# Patient Record
Sex: FEMALE | Race: WHITE | URBAN - METROPOLITAN AREA
[De-identification: names, ages, dates, MRNs, and addresses within clinical notes are randomized per-mention and may not be internally consistent; named-entity substitution may affect disease eponyms.]

---

## 2024-02-21 ENCOUNTER — LAB (OUTPATIENT)
Dept: LAB | Facility: LAB | Age: 54
End: 2024-02-21
Payer: COMMERCIAL

## 2024-02-21 ENCOUNTER — PRE-ADMISSION TESTING (OUTPATIENT)
Dept: PREADMISSION TESTING | Facility: HOSPITAL | Age: 54
End: 2024-02-21
Payer: COMMERCIAL

## 2024-02-21 VITALS
RESPIRATION RATE: 16 BRPM | HEIGHT: 64 IN | DIASTOLIC BLOOD PRESSURE: 77 MMHG | SYSTOLIC BLOOD PRESSURE: 154 MMHG | HEART RATE: 66 BPM | BODY MASS INDEX: 23.86 KG/M2 | WEIGHT: 139.77 LBS | OXYGEN SATURATION: 97 % | TEMPERATURE: 98.6 F

## 2024-02-21 DIAGNOSIS — S83.232D COMPLEX TEAR OF MEDIAL MENISCUS OF LEFT KNEE AS CURRENT INJURY, SUBSEQUENT ENCOUNTER: ICD-10-CM

## 2024-02-21 DIAGNOSIS — Z01.818 PRE-OP TESTING: ICD-10-CM

## 2024-02-21 DIAGNOSIS — Z01.818 PRE-OP TESTING: Primary | ICD-10-CM

## 2024-02-21 LAB
ANION GAP SERPL CALC-SCNC: 11 MMOL/L (ref 10–20)
BUN SERPL-MCNC: 13 MG/DL (ref 6–23)
CALCIUM SERPL-MCNC: 9.7 MG/DL (ref 8.6–10.3)
CHLORIDE SERPL-SCNC: 101 MMOL/L (ref 98–107)
CO2 SERPL-SCNC: 30 MMOL/L (ref 21–32)
CREAT SERPL-MCNC: 0.73 MG/DL (ref 0.5–1.05)
EGFRCR SERPLBLD CKD-EPI 2021: >90 ML/MIN/1.73M*2
GLUCOSE SERPL-MCNC: 84 MG/DL (ref 74–99)
POTASSIUM SERPL-SCNC: 3.3 MMOL/L (ref 3.5–5.3)
SODIUM SERPL-SCNC: 139 MMOL/L (ref 136–145)

## 2024-02-21 PROCEDURE — 99203 OFFICE O/P NEW LOW 30 MIN: CPT | Performed by: NURSE PRACTITIONER

## 2024-02-21 PROCEDURE — 80048 BASIC METABOLIC PNL TOTAL CA: CPT

## 2024-02-21 RX ORDER — LOSARTAN POTASSIUM 25 MG/1
25 TABLET ORAL DAILY
COMMUNITY

## 2024-02-21 RX ORDER — FERROUS SULFATE, DRIED 160(50) MG
1 TABLET, EXTENDED RELEASE ORAL DAILY
COMMUNITY

## 2024-02-21 RX ORDER — CHOLECALCIFEROL (VITAMIN D3) 50 MCG
50 TABLET ORAL DAILY
COMMUNITY

## 2024-02-21 RX ORDER — POTASSIUM CHLORIDE 750 MG/1
10 TABLET, FILM COATED, EXTENDED RELEASE ORAL DAILY
COMMUNITY

## 2024-02-21 RX ORDER — HYDROCHLOROTHIAZIDE 25 MG/1
25 TABLET ORAL DAILY
COMMUNITY

## 2024-02-21 ASSESSMENT — CHADS2 SCORE
PRIOR STROKE OR TIA OR THROMBOEMBOLISM: NO
AGE GREATER THAN OR EQUAL TO 75: NO
CHF: NO
CHADS2 SCORE: 1
DIABETES: NO
HYPERTENSION: YES

## 2024-02-21 ASSESSMENT — DUKE ACTIVITY SCORE INDEX (DASI)
CAN YOU WALK A BLOCK OR TWO ON LEVEL GROUND: YES
CAN YOU PARTICIPATE IN STRENOUS SPORTS LIKE SWIMMING, SINGLES TENNIS, FOOTBALL, BASKETBALL, OR SKIING: NO
CAN YOU CLIMB A FLIGHT OF STAIRS OR WALK UP A HILL: YES
CAN YOU PARTICIPATE IN MODERATE RECREATIONAL ACTIVITIES LIKE GOLF, BOWLING, DANCING, DOUBLES TENNIS OR THROWING A BASEBALL OR FOOTBALL: NO
CAN YOU HAVE SEXUAL RELATIONS: YES
CAN YOU DO YARD WORK LIKE RAKING LEAVES, WEEDING OR PUSHING A MOWER: YES
DASI METS SCORE: 6.3
CAN YOU TAKE CARE OF YOURSELF (EAT, DRESS, BATHE, OR USE TOILET): YES
CAN YOU WALK INDOORS, SUCH AS AROUND YOUR HOUSE: YES
TOTAL_SCORE: 28.7
CAN YOU DO HEAVY WORK AROUND THE HOUSE LIKE SCRUBBING FLOORS OR LIFTING AND MOVING HEAVY FURNITURE: NO
CAN YOU RUN A SHORT DISTANCE: NO
CAN YOU DO MODERATE WORK AROUND THE HOUSE LIKE VACUUMING, SWEEPING FLOORS OR CARRYING GROCERIES: YES
CAN YOU DO LIGHT WORK AROUND THE HOUSE LIKE DUSTING OR WASHING DISHES: YES

## 2024-02-21 ASSESSMENT — ACTIVITIES OF DAILY LIVING (ADL): ADL_SCORE: 0

## 2024-02-21 ASSESSMENT — LIFESTYLE VARIABLES: SMOKING_STATUS: NONSMOKER

## 2024-02-21 ASSESSMENT — PAIN - FUNCTIONAL ASSESSMENT: PAIN_FUNCTIONAL_ASSESSMENT: 0-10

## 2024-02-21 ASSESSMENT — PAIN SCALES - GENERAL: PAINLEVEL_OUTOF10: 0 - NO PAIN

## 2024-02-21 NOTE — PREPROCEDURE INSTRUCTIONS
Medication List            Accurate as of February 21, 2024  8:36 AM. Always use your most recent med list.                calcium carbonate-vitamin D3 500 mg-5 mcg (200 unit) tablet  Medication Adjustments for Surgery: Stop 7 days before surgery     hydroCHLOROthiazide 25 mg tablet  Commonly known as: HYDRODiuril  Medication Adjustments for Surgery: Take morning of surgery with sip of water, no other fluids     losartan 25 mg tablet  Commonly known as: Cozaar  Medication Adjustments for Surgery: Other (Comment)  Notes to patient: HOLD any evening dose the night before the day of surgery  HOLD the day of surgery     potassium chloride CR 10 mEq ER tablet  Commonly known as: Klor-Con  Medication Adjustments for Surgery: Continue until night before surgery     Vitamin D3 50 MCG (2000 UT) tablet  Generic drug: cholecalciferol  Medication Adjustments for Surgery: Stop 7 days before surgery                          PRE-OPERATIVE INSTRUCTIONS    You will receive notification one business day prior to your surgery to confirm your arrival time and additional information. It is important that you answer your phone and/or check your messages during this time.    Please enter the building through the Outpatient entrance. Take the elevator off the lobby to the 2nd floor and check in at the Outpatient Surgery desk    INSTRUCTIONS:  Talk to your surgeon for instructions if you should stop your aspirin, blood thinner, or diabetes medicines.  DO NOT take any multivitamins or over the counter supplements for 7-10 days before surgery.  If not being admitted, you must have an adult immediately available to drive you home after surgery. We also highly recommend you have someone stay with you for the entire day and night of your surgery.  For children having surgery, a parent or legal guardian must accompany them to the surgery center. If this is not possible, please call [phone] to make additional arrangements.  For adults who  are unable to consent or make medical decisions for themselves, a legal guardian or Power of  must accompany them to the surgery center. If this is not possible, please call [phone] to make additional arrangements.  Wear comfortable, loose fitting clothing.  All jewelry and piercings must be removed. If you are unable to remove an item or have a dermal piercing, please be sure to tell the nurse when you arrive for surgery.  Nail polish and make-up must be removed.  Avoid smoking or consuming alcohol for 24 hours before surgery.  To help prevent infection, please take a shower/bath and wash your hair the night before and/or morning of surgery.    Additional instructions about eating and drinking before surgery:  Do not eat any solid foods after midnight. Milk, nutritional drinks/supplements, and infant formula are considered solid foods.  You may drink up to 12 oz. of clear liquids up to 2 hours before your arrival time for surgery, unless directed otherwise by your surgeon. Clear liquids include water, non-carbonated sports drinks (Gatorade), black tea or coffee (no creamers) and breast milk.    If you received a blue folder, please review additional information provided inside the folder regarding additional preparation.     If you have any questions or concerns, please call Pre-Admission Testing at [phone].        Preoperative Bathing instructions    Follow these instructions the evening before and morning of surgery:  Do not shave the day before or day of surgery.  Remove all jewelry until after surgery. Take off rings and take out all body-piercing jewelry.  Use a clean wash cloth and towel.  Wash your face and hair with your normal soap and shampoo before you use the CHG soap.  Use a wash cloth to clean your skin with the CHG soap. Use enough CHG soap to cover the skin on your whole body. Use the same amount as you would with your normal soap.  Do not use the CHG soap on your face, eyes,  ears, or head.  Do not scrub your skin too hard.  Be sure to clean the area well where surgery will be done.  Do not wash with your normal soap after the CHG soap.  Keep away from the water stream when you put CHG soap on. This keeps it from rinsing off too soon.  Rinse your body well.  Pat yourself dry with a clean, soft towel.  Put on clean clothing.  Do not put on any deodorants, lotions, or oils after showering. These might block how the CHG soap works.

## 2024-02-21 NOTE — CPM/PAT H&P
"CPM/PAT Evaluation       Name: Eri Tamayo (Eri Tamayo)  /Age: 3/17//53 y.o.     In-Person       Chief Complaint: left knee pains    HPI    JAROD is a 52 yo female who injured herself this past 2023 with a fall in the bathroom when she heard a \"pop\" and since then her left knee pains has been \"limited and painful\". Imaging showed left meniscus tear and subsequently scheduled for left knee arthroscopy meniscectomy. Skin intact on surgical limb. Otherwise denies any recent illness, fever/chills, chest pains or shortness of breath.     Past Medical History:   Diagnosis Date    Autoimmune thyroiditis 2013    Hashimoto's thyroiditis    Encounter for gynecological examination (general) (routine) without abnormal findings     Pap smear, as part of routine gynecological examination    Essential (primary) hypertension 2013    Hypertension    GERD (gastroesophageal reflux disease)     Hypokalemia     Left bundle branch block (LBBB)     Tear of meniscus of left knee, unspecified meniscus, unspecified tear type, unspecified whether old or current tear      PCP: Dr. Aguilar Greer  Endo: Dr. Vazquez    Past Surgical History:   Procedure Laterality Date     SECTION, CLASSIC  2015     Section     SECTION, CLASSIC      DILATION AND CURETTAGE OF UTERUS  2015    Dilation And Curettage    TUBAL LIGATION  2015    Tubal Ligation    TUBAL LIGATION         Patient Sexual activity questions deferred to the physician.    Family History   Problem Relation Name Age of Onset    Heart disease Mother      Hypertension Mother         Allergies   Allergen Reactions    Hydrocortisone Confusion    Sulfa (Sulfonamide Antibiotics) Shortness of breath    Aloe Vera Rash    Penicillins Rash       Prior to Admission medications    Not on File        PAT ROS   Constitutional: Negative for fever, chills, or sweats   ENMT: Negative for nasal discharge, congestion, ear pain, mouth pain, " throat pain   Respiratory: Negative for cough, wheezing, shortness of breath   Cardiac: Negative for chest pain, dyspnea on exertion, palpitations   Gastrointestinal: Negative for nausea, vomiting, diarrhea, constipation, abdominal pain  Genitourinary: Negative for dysuria, flank pain, frequency, hematuria     Musculoskeletal: Positive for decreased ROM, pain, swelling, weakness in left knee     Neurological: Negative for dizziness, confusion, headache  Psychiatric: Negative for mood changes   Skin: Negative for itching, rash, ulcer    Hematologic/Lymph: Negative for bruising, easy bleeding  Allergic/Immunologic: Negative itching, sneezing, swelling      Physical Exam  HENT:      Head: Normocephalic.      Mouth/Throat:      Mouth: Mucous membranes are moist.   Eyes:      Extraocular Movements: Extraocular movements intact.   Cardiovascular:      Rate and Rhythm: Normal rate and regular rhythm.   Pulmonary:      Effort: Pulmonary effort is normal.      Breath sounds: Normal breath sounds.   Abdominal:      General: Abdomen is flat.      Palpations: Abdomen is soft.   Musculoskeletal:         General: Normal range of motion.      Cervical back: Normal range of motion.   Skin:     General: Skin is warm and dry.   Neurological:      General: No focal deficit present.      Mental Status: She is alert.   Psychiatric:         Mood and Affect: Mood normal.          PAT AIRWAY:   Airway:     Neck ROM::  Full  normal      Anesthesia:  Patient denies any anesthesia complications.     Visit Vitals  /77   Pulse 66   Temp 37 °C (98.6 °F)   Resp 16       DASI Risk Score      Flowsheet Row Most Recent Value   DASI SCORE 28.7   METS Score (Will be calculated only when all the questions are answered) 6.3          Caprini DVT Assessment      Flowsheet Row Most Recent Value   DVT Score 2   History Prior major surgery   Age 40-59 years          Modified Frailty Index      Flowsheet Row Most Recent Value   Modified Frailty Index  Calculator .0909          CHADS2 Stroke Risk  Current as of 14 minutes ago        N/A 3 - 100%: High Risk   2 - 3%: Medium Risk   0 - 2%: Low Risk     Last Change: N/A          This score determines the patient's risk of having a stroke if the patient has atrial fibrillation.        This score is not applicable to this patient. Components are not calculated.          Revised Cardiac Risk Index      Flowsheet Row Most Recent Value   Revised Cardiac Risk Calculator 0          Apfel Simplified Score    No data to display       Risk Analysis Index Results This Encounter         2/21/2024  0836             LY Cancer History: Patient does not indicate history of cancer          Stop Bang Score      Flowsheet Row Most Recent Value   Do you snore loudly? 1   Do you often feel tired or fatigued after your sleep? 0   Has anyone ever observed you stop breathing in your sleep? 0   Do you have or are you being treated for high blood pressure? 0   Recent BMI (Calculated) 24   Is BMI greater than 35 kg/m2? 0=No   Age older than 50 years old? 1=Yes   Is your neck circumference greater than 17 inches (Male) or 16 inches (Female)? 0   Gender - Male 0=No   STOP-BANG Total Score 2            Assessment and Plan:     54 yo female scheduled for left knee partial medial meniscectomy on 3/5/2024 with Dr. Montilla. BMP ordered. Urine pregnancy ordered for the morning of surgery.   Otherwise no further orders indicated.     See risk scores as previously documented.

## 2024-03-05 ENCOUNTER — ANESTHESIA EVENT (OUTPATIENT)
Dept: OPERATING ROOM | Facility: HOSPITAL | Age: 54
End: 2024-03-05
Payer: COMMERCIAL

## 2024-03-05 ENCOUNTER — ANESTHESIA (OUTPATIENT)
Dept: OPERATING ROOM | Facility: HOSPITAL | Age: 54
End: 2024-03-05
Payer: COMMERCIAL

## 2024-03-05 ENCOUNTER — HOSPITAL ENCOUNTER (OUTPATIENT)
Facility: HOSPITAL | Age: 54
Setting detail: OUTPATIENT SURGERY
Discharge: HOME | End: 2024-03-05
Attending: ORTHOPAEDIC SURGERY | Admitting: ORTHOPAEDIC SURGERY
Payer: COMMERCIAL

## 2024-03-05 VITALS
HEART RATE: 67 BPM | RESPIRATION RATE: 16 BRPM | WEIGHT: 139.77 LBS | HEIGHT: 64 IN | OXYGEN SATURATION: 99 % | SYSTOLIC BLOOD PRESSURE: 140 MMHG | BODY MASS INDEX: 23.86 KG/M2 | DIASTOLIC BLOOD PRESSURE: 70 MMHG | TEMPERATURE: 97.3 F

## 2024-03-05 DIAGNOSIS — M23.322 MEDIAL MENISCUS, POSTERIOR HORN DERANGEMENT, LEFT: Primary | ICD-10-CM

## 2024-03-05 LAB — HCG UR QL IA.RAPID: NEGATIVE

## 2024-03-05 PROCEDURE — A29881 PR KNEE SCOPE,MED/LAT MENISECTOMY: Performed by: ANESTHESIOLOGY

## 2024-03-05 PROCEDURE — A29881 PR KNEE SCOPE,MED/LAT MENISECTOMY: Performed by: ANESTHESIOLOGIST ASSISTANT

## 2024-03-05 PROCEDURE — 7100000001 HC RECOVERY ROOM TIME - INITIAL BASE CHARGE: Performed by: ORTHOPAEDIC SURGERY

## 2024-03-05 PROCEDURE — 3700000001 HC GENERAL ANESTHESIA TIME - INITIAL BASE CHARGE: Performed by: ORTHOPAEDIC SURGERY

## 2024-03-05 PROCEDURE — 3600000004 HC OR TIME - INITIAL BASE CHARGE - PROCEDURE LEVEL FOUR: Performed by: ORTHOPAEDIC SURGERY

## 2024-03-05 PROCEDURE — 3700000002 HC GENERAL ANESTHESIA TIME - EACH INCREMENTAL 1 MINUTE: Performed by: ORTHOPAEDIC SURGERY

## 2024-03-05 PROCEDURE — 3600000009 HC OR TIME - EACH INCREMENTAL 1 MINUTE - PROCEDURE LEVEL FOUR: Performed by: ORTHOPAEDIC SURGERY

## 2024-03-05 PROCEDURE — 2720000007 HC OR 272 NO HCPCS: Performed by: ORTHOPAEDIC SURGERY

## 2024-03-05 PROCEDURE — 2500000005 HC RX 250 GENERAL PHARMACY W/O HCPCS: Performed by: ANESTHESIOLOGIST ASSISTANT

## 2024-03-05 PROCEDURE — 7100000002 HC RECOVERY ROOM TIME - EACH INCREMENTAL 1 MINUTE: Performed by: ORTHOPAEDIC SURGERY

## 2024-03-05 PROCEDURE — 81025 URINE PREGNANCY TEST: CPT | Performed by: ORTHOPAEDIC SURGERY

## 2024-03-05 PROCEDURE — 2500000004 HC RX 250 GENERAL PHARMACY W/ HCPCS (ALT 636 FOR OP/ED): Performed by: ORTHOPAEDIC SURGERY

## 2024-03-05 PROCEDURE — 2500000004 HC RX 250 GENERAL PHARMACY W/ HCPCS (ALT 636 FOR OP/ED): Performed by: ANESTHESIOLOGIST ASSISTANT

## 2024-03-05 PROCEDURE — 7100000010 HC PHASE TWO TIME - EACH INCREMENTAL 1 MINUTE: Performed by: ORTHOPAEDIC SURGERY

## 2024-03-05 PROCEDURE — 7100000009 HC PHASE TWO TIME - INITIAL BASE CHARGE: Performed by: ORTHOPAEDIC SURGERY

## 2024-03-05 RX ORDER — KETOROLAC TROMETHAMINE 30 MG/ML
INJECTION, SOLUTION INTRAMUSCULAR; INTRAVENOUS AS NEEDED
Status: DISCONTINUED | OUTPATIENT
Start: 2024-03-05 | End: 2024-03-05

## 2024-03-05 RX ORDER — ACETAMINOPHEN 325 MG/1
650 TABLET ORAL EVERY 4 HOURS PRN
Status: DISCONTINUED | OUTPATIENT
Start: 2024-03-05 | End: 2024-03-07 | Stop reason: HOSPADM

## 2024-03-05 RX ORDER — SODIUM CHLORIDE, SODIUM LACTATE, POTASSIUM CHLORIDE, CALCIUM CHLORIDE 600; 310; 30; 20 MG/100ML; MG/100ML; MG/100ML; MG/100ML
100 INJECTION, SOLUTION INTRAVENOUS CONTINUOUS
Status: DISCONTINUED | OUTPATIENT
Start: 2024-03-05 | End: 2024-03-07 | Stop reason: HOSPADM

## 2024-03-05 RX ORDER — ALBUTEROL SULFATE 0.83 MG/ML
2.5 SOLUTION RESPIRATORY (INHALATION) ONCE AS NEEDED
Status: DISCONTINUED | OUTPATIENT
Start: 2024-03-05 | End: 2024-03-07 | Stop reason: HOSPADM

## 2024-03-05 RX ORDER — HYDROCODONE BITARTRATE AND ACETAMINOPHEN 5; 325 MG/1; MG/1
1 TABLET ORAL EVERY 4 HOURS PRN
Status: DISCONTINUED | OUTPATIENT
Start: 2024-03-05 | End: 2024-03-07 | Stop reason: HOSPADM

## 2024-03-05 RX ORDER — ONDANSETRON HYDROCHLORIDE 2 MG/ML
INJECTION, SOLUTION INTRAVENOUS AS NEEDED
Status: DISCONTINUED | OUTPATIENT
Start: 2024-03-05 | End: 2024-03-05

## 2024-03-05 RX ORDER — MIDAZOLAM HYDROCHLORIDE 1 MG/ML
INJECTION, SOLUTION INTRAMUSCULAR; INTRAVENOUS AS NEEDED
Status: DISCONTINUED | OUTPATIENT
Start: 2024-03-05 | End: 2024-03-05

## 2024-03-05 RX ORDER — HYDROMORPHONE HYDROCHLORIDE 1 MG/ML
1 INJECTION, SOLUTION INTRAMUSCULAR; INTRAVENOUS; SUBCUTANEOUS EVERY 5 MIN PRN
Status: DISCONTINUED | OUTPATIENT
Start: 2024-03-05 | End: 2024-03-07 | Stop reason: HOSPADM

## 2024-03-05 RX ORDER — ACETAMINOPHEN 325 MG/1
975 TABLET ORAL ONCE
Status: DISCONTINUED | OUTPATIENT
Start: 2024-03-05 | End: 2024-03-07 | Stop reason: HOSPADM

## 2024-03-05 RX ORDER — OXYCODONE HYDROCHLORIDE 10 MG/1
10 TABLET ORAL EVERY 4 HOURS PRN
Status: DISCONTINUED | OUTPATIENT
Start: 2024-03-05 | End: 2024-03-07 | Stop reason: HOSPADM

## 2024-03-05 RX ORDER — SCOLOPAMINE TRANSDERMAL SYSTEM 1 MG/1
1 PATCH, EXTENDED RELEASE TRANSDERMAL ONCE
Status: DISCONTINUED | OUTPATIENT
Start: 2024-03-05 | End: 2024-03-07 | Stop reason: HOSPADM

## 2024-03-05 RX ORDER — BUPIVACAINE HYDROCHLORIDE 5 MG/ML
INJECTION, SOLUTION EPIDURAL; INTRACAUDAL AS NEEDED
Status: DISCONTINUED | OUTPATIENT
Start: 2024-03-05 | End: 2024-03-05 | Stop reason: HOSPADM

## 2024-03-05 RX ORDER — HYDROMORPHONE HYDROCHLORIDE 1 MG/ML
0.5 INJECTION, SOLUTION INTRAMUSCULAR; INTRAVENOUS; SUBCUTANEOUS EVERY 5 MIN PRN
Status: DISCONTINUED | OUTPATIENT
Start: 2024-03-05 | End: 2024-03-07 | Stop reason: HOSPADM

## 2024-03-05 RX ORDER — HYDROCODONE BITARTRATE AND ACETAMINOPHEN 5; 325 MG/1; MG/1
1 TABLET ORAL EVERY 6 HOURS PRN
Qty: 20 TABLET | Refills: 0 | Status: SHIPPED | OUTPATIENT
Start: 2024-03-05 | End: 2024-03-12

## 2024-03-05 RX ORDER — ONDANSETRON HYDROCHLORIDE 2 MG/ML
4 INJECTION, SOLUTION INTRAVENOUS ONCE AS NEEDED
Status: DISCONTINUED | OUTPATIENT
Start: 2024-03-05 | End: 2024-03-07 | Stop reason: HOSPADM

## 2024-03-05 RX ORDER — CEFAZOLIN SODIUM 2 G/100ML
INJECTION, SOLUTION INTRAVENOUS AS NEEDED
Status: DISCONTINUED | OUTPATIENT
Start: 2024-03-05 | End: 2024-03-05

## 2024-03-05 RX ORDER — MIDAZOLAM HYDROCHLORIDE 1 MG/ML
1 INJECTION, SOLUTION INTRAMUSCULAR; INTRAVENOUS ONCE AS NEEDED
Status: DISCONTINUED | OUTPATIENT
Start: 2024-03-05 | End: 2024-03-07 | Stop reason: HOSPADM

## 2024-03-05 RX ORDER — HYDRALAZINE HYDROCHLORIDE 20 MG/ML
5 INJECTION INTRAMUSCULAR; INTRAVENOUS EVERY 30 MIN PRN
Status: DISCONTINUED | OUTPATIENT
Start: 2024-03-05 | End: 2024-03-07 | Stop reason: HOSPADM

## 2024-03-05 RX ORDER — DEXAMETHASONE SODIUM PHOSPHATE 4 MG/ML
INJECTION, SOLUTION INTRA-ARTICULAR; INTRALESIONAL; INTRAMUSCULAR; INTRAVENOUS; SOFT TISSUE AS NEEDED
Status: DISCONTINUED | OUTPATIENT
Start: 2024-03-05 | End: 2024-03-05

## 2024-03-05 RX ORDER — SODIUM CHLORIDE, SODIUM LACTATE, POTASSIUM CHLORIDE, CALCIUM CHLORIDE 600; 310; 30; 20 MG/100ML; MG/100ML; MG/100ML; MG/100ML
INJECTION, SOLUTION INTRAVENOUS CONTINUOUS PRN
Status: DISCONTINUED | OUTPATIENT
Start: 2024-03-05 | End: 2024-03-05

## 2024-03-05 RX ORDER — FENTANYL CITRATE 50 UG/ML
INJECTION, SOLUTION INTRAMUSCULAR; INTRAVENOUS AS NEEDED
Status: DISCONTINUED | OUTPATIENT
Start: 2024-03-05 | End: 2024-03-05

## 2024-03-05 RX ORDER — HYDROMORPHONE HYDROCHLORIDE 1 MG/ML
0.1 INJECTION, SOLUTION INTRAMUSCULAR; INTRAVENOUS; SUBCUTANEOUS EVERY 5 MIN PRN
Status: DISCONTINUED | OUTPATIENT
Start: 2024-03-05 | End: 2024-03-07 | Stop reason: HOSPADM

## 2024-03-05 RX ORDER — LIDOCAINE HYDROCHLORIDE 20 MG/ML
INJECTION, SOLUTION INFILTRATION; PERINEURAL AS NEEDED
Status: DISCONTINUED | OUTPATIENT
Start: 2024-03-05 | End: 2024-03-05

## 2024-03-05 RX ORDER — PROPOFOL 10 MG/ML
INJECTION, EMULSION INTRAVENOUS AS NEEDED
Status: DISCONTINUED | OUTPATIENT
Start: 2024-03-05 | End: 2024-03-05

## 2024-03-05 RX ADMIN — MIDAZOLAM 2 MG: 1 INJECTION INTRAMUSCULAR; INTRAVENOUS at 14:47

## 2024-03-05 RX ADMIN — PROPOFOL 200 MG: 10 INJECTION, EMULSION INTRAVENOUS at 14:50

## 2024-03-05 RX ADMIN — DEXAMETHASONE SODIUM PHOSPHATE 4 MG: 4 INJECTION INTRA-ARTICULAR; INTRALESIONAL; INTRAMUSCULAR; INTRAVENOUS; SOFT TISSUE at 14:56

## 2024-03-05 RX ADMIN — LIDOCAINE HYDROCHLORIDE 100 MG: 20 INJECTION, SOLUTION INFILTRATION; PERINEURAL at 14:50

## 2024-03-05 RX ADMIN — SODIUM CHLORIDE, POTASSIUM CHLORIDE, SODIUM LACTATE AND CALCIUM CHLORIDE: 600; 310; 30; 20 INJECTION, SOLUTION INTRAVENOUS at 14:46

## 2024-03-05 RX ADMIN — FENTANYL CITRATE 25 MCG: 50 INJECTION, SOLUTION INTRAMUSCULAR; INTRAVENOUS at 14:58

## 2024-03-05 RX ADMIN — CEFAZOLIN SODIUM 2 G: 2 INJECTION, SOLUTION INTRAVENOUS at 15:02

## 2024-03-05 RX ADMIN — FENTANYL CITRATE 75 MCG: 50 INJECTION, SOLUTION INTRAMUSCULAR; INTRAVENOUS at 15:07

## 2024-03-05 RX ADMIN — ONDANSETRON 4 MG: 2 INJECTION, SOLUTION INTRAMUSCULAR; INTRAVENOUS at 14:56

## 2024-03-05 RX ADMIN — KETOROLAC TROMETHAMINE 30 MG: 30 INJECTION, SOLUTION INTRAMUSCULAR; INTRAVENOUS at 15:29

## 2024-03-05 SDOH — HEALTH STABILITY: MENTAL HEALTH: CURRENT SMOKER: 0

## 2024-03-05 ASSESSMENT — PAIN - FUNCTIONAL ASSESSMENT
PAIN_FUNCTIONAL_ASSESSMENT: 0-10

## 2024-03-05 ASSESSMENT — COLUMBIA-SUICIDE SEVERITY RATING SCALE - C-SSRS
2. HAVE YOU ACTUALLY HAD ANY THOUGHTS OF KILLING YOURSELF?: NO
6. HAVE YOU EVER DONE ANYTHING, STARTED TO DO ANYTHING, OR PREPARED TO DO ANYTHING TO END YOUR LIFE?: NO
1. IN THE PAST MONTH, HAVE YOU WISHED YOU WERE DEAD OR WISHED YOU COULD GO TO SLEEP AND NOT WAKE UP?: NO

## 2024-03-05 ASSESSMENT — PAIN SCALES - GENERAL
PAINLEVEL_OUTOF10: 1
PAINLEVEL_OUTOF10: 1
PAINLEVEL_OUTOF10: 2
PAINLEVEL_OUTOF10: 1
PAINLEVEL_OUTOF10: 3
PAINLEVEL_OUTOF10: 1
PAIN_LEVEL: 0

## 2024-03-05 ASSESSMENT — PAIN DESCRIPTION - DESCRIPTORS
DESCRIPTORS: DISCOMFORT

## 2024-03-05 NOTE — DISCHARGE INSTRUCTIONS
Patient underwent a left knee arthroscopic partial medial meniscectomy and medial plica excision.    Dressing to remain on until 3/7/2024.    Weightbearing as tolerated to the right lower extremity.  Please use crutches or walker to aid in ambulation during the first several days.    Move knee regularly to reduce swelling and promote circulation.    Follow-up after dressing removal may cover with nonadherent gauze and Ace wrap or Band-Aids.    After first dressing removal okay to shower however should not saturate the surgical incision and not submerge the incision.  Pat surgical area dry with clean towel after showering.    If you notice any, redness, drainage or your incision comes apart please call the office [phone].    No lifting, pulling, or pushing with the right upper extremity greater than 5 pounds.    Patient may take ibuprofen 400 mg every 6 hours or Tylenol 500 mg every 6 hours to help with pain relief.    You may resume your prehospital diet.    You are also given a prescription for narcotic medication.  This should be used intermittently and only on an as-needed basis.    Follow-up in office at previously scheduled appointment.    Juan Diego Montilla MD  [facility]  [phone]

## 2024-03-05 NOTE — OP NOTE
LEFT KNEE ARTHROSCOPIC PARTIAL MEDIAL MENISCECTOMY (L) Operative Note     Date: 3/5/2024  OR Location: [facility] OR    Name: Eri Tamayo, : 1970, Age: 53 y.o., MRN: [id number], Sex: female    Diagnosis  Pre-op Diagnosis     * Complex tear of medial meniscus of left knee as current injury, subsequent encounter [S86.199D] Post-op Diagnosis     * Complex tear of medial meniscus of left knee as current injury, subsequent encounter [S83.232D]     Procedures  Left knee arthroscopic partial medial meniscectomy  Left knee arthroscopic medial plica excision    Surgeons      * Juan Diego Montilla - Primary    Resident/Fellow/Other Assistant:  Surgeon(s) and Role:    Procedure Summary  Anesthesia: General  ASA: II  Anesthesia Staff: Anesthesiologist: Kana Almanza MD  C-AA: AMAYA Le  Estimated Blood Loss: 10 mL  Intra-op Medications: Administrations occurring from 1340 to 1530 on 24:  * No intraprocedure medications in log *           Anesthesia Record               Intraprocedure I/O Totals       None           Specimen: No specimens collected     Staff:   Circulator: Roberth Cuevas RN  Scrub Person: Farzaneh Jacobson         Drains and/or Catheters: * None in log *    Tourniquet Times:     Total Tourniquet Time Documented:  Thigh (Left) - 26 minutes  Total: Thigh (Left) - 26 minutes      Implants:     Findings: Partial tearing left knee medial meniscus and pathologic medial plica    Indications: Eri Tamayo is an 53 y.o. female who is having surgery for persistent knee pain with mechanical symptoms along the medial aspect of the left knee.  Patient and trialed multiple conservative modalities and failed conservative treatment in that regard.  Treatment options were discussed with patient in regards to left knee arthroscopic surgery.  Patient made aware of risk benefits and alternatives and did elect to proceed.    The patient was seen in the preoperative area. The risks, benefits,  complications, treatment options, non-operative alternatives, expected recovery and outcomes were discussed with the patient. The possibilities of reaction to medication, pulmonary aspiration, injury to surrounding structures, bleeding, recurrent infection, the need for additional procedures, failure to diagnose a condition, and creating a complication requiring transfusion or operation were discussed with the patient. The patient concurred with the proposed plan, giving informed consent.  The site of surgery was properly noted/marked if necessary per policy. The patient has been actively warmed in preoperative area. Preoperative antibiotics are indicated and given within 1 hour of incision.  Venous thrombosis prophylaxis are not indicated.    Procedure Details: The left lower extremity is prepped and draped as typical for extremity procedure.  Timeout was performed, all parties identified, and the correct surgical site was noted.  At the conclusion of the timeout, a standard inferolateral portal was established and trocar was inserted into the joint space.  This was advanced into the suprapatellar pouch and inflow was established which insufflated the knee.  Diagnostic arthroscopy was begun at the level of the suprapatellar pouch which demonstrated mild synovitis no loose bodies.  The knee scope was then moved into the lateral gutter findings demonstrated no loose bodies.  Lateral joint space was then visualized amd was intact without evidence of tear of the meniscus.  The lateral femoral condyle was visualized in entirety through flexion extension without substantial degenerative change and normal-appearing cartilage.  The lateral tibial plateau was then evaluated and again found to be intact without deformity or evidence of fracture or contusion.  The scope was then moved into the trochlea where ACL and PCL were evaluated and found to be competent without evidence of disruption.  Medial joint space was then  evaluated.  Medial femoral condyle was found to have focal areas of chondral softening consistent with grade 3 change this was in the area of a large medial plica which appeared to be grinding on the medial femoral condyle on the area of softening and degenerative change.  Medial tibial plateau demonstrated small area of potential grade 2/grade 3 chondral change.  Medial meniscus demonstrated fraying around the inner rim with small tearing.  The medial gutter was visualized and no evidence of loose body was identified.  A inferior medial portal was established using spinal needle to localize incisional site.     After establishing my inferior medial portal, I subsequently proceeded then to perform a partial arthroscopic medial meniscectomy.  Using shaver all damaged portions of the medial meniscus that were mobile and at risk for persistent pain or impingement were subsequently excised.  Probe was utilized after appropriate contouring and removal of damaged fragments was performed and meniscus was stable with no persistent loose remnants or excessively mobile portions of the medial meniscus.    I then turned my attention to the substantial medial plica and subsequently excised this to allow for resolution of fracture of the plica on the medial femoral condyle.  The knee was then passed through range of motion and visualized with no evidence of persistent grinding of the medial plica on the medial femoral condyle.    Scope and shaver were subsequently removed and as much fluid was evacuated from the knee as possible.  Infiltrated portal sites with Marcaine for local analgesia.  Skin was closed with nylon suture.  A soft compressive dressing was applied.  Patient tolerated procedure well.  No complications occurred.    An assistant was used during this case.  Assistant Odell Jacobson helped in the positioning, prepping, draping, retracting and aiding in my ability to perform critical portions of this case and  performed the majority of the closure and dressing application.  Help from the assistant was critical in performing these functions.    Complications:  None; patient tolerated the procedure well.    Disposition: PACU - hemodynamically stable.  Condition: stable         Postoperative plan: Patient be discharged home with weightbearing as tolerated activity to the left lower extremity.  Should utilize ibuprofen and Tylenol for pain relief with intermittent narcotics as needed.  Should use crutches to help mobilize over the first day or 2 and gradually progress activity as tolerated.  Will see patient in scheduled follow-up in office.      Juan Diego Montilla  Phone Number: [phone]

## 2024-03-05 NOTE — ANESTHESIA POSTPROCEDURE EVALUATION
Patient: Eri Tamayo    Procedure Summary       Date: 03/05/24 Room / Location: [facility] OR 06 / Virtual [facility] OR    Anesthesia Start: 1447 Anesthesia Stop: 1550    Procedure: LEFT KNEE ARTHROSCOPIC PARTIAL MEDIAL MENISCECTOMY (Left: Knee) Diagnosis:       Complex tear of medial meniscus of left knee as current injury, subsequent encounter      (S83.232D)    Surgeons: Juan Diego Montilla MD Responsible Provider: Kana Almanza MD    Anesthesia Type: general ASA Status: 2            Anesthesia Type: general    Vitals Value Taken Time   /72 03/05/24 1545   Temp 36.4 03/05/24 1550   Pulse 61 03/05/24 1547   Resp 14 03/05/24 1547   SpO2 99 % 03/05/24 1547   Vitals shown include unvalidated device data.    Anesthesia Post Evaluation    Patient location during evaluation: PACU  Patient participation: complete - patient cannot participate  Level of consciousness: sleepy but conscious  Pain score: 0  Pain management: adequate  Multimodal analgesia pain management approach  Airway patency: patent  Two or more strategies used to mitigate risk of obstructive sleep apnea  Cardiovascular status: acceptable and stable  Respiratory status: acceptable and face mask  Hydration status: acceptable  Postoperative Nausea and Vomiting: none        No notable events documented.

## 2024-03-05 NOTE — ANESTHESIA PREPROCEDURE EVALUATION
Patient: Eri Tamayo    Procedure Information       Date/Time: 03/05/24 1340    Procedure: LEFT KNEE ARTHROSCOPIC PARTIAL MEDIAL MENISCECTOMY (Left: Knee)    Location: [facility] OR 06 / [facility] OR    Surgeons: Juan Diego Montilla MD            Relevant Problems   No relevant active problems       Clinical information reviewed:   Tobacco  Allergies  Meds   Med Hx  Surg Hx  OB Status  Fam Hx  Soc   Hx        NPO Detail:  NPO/Void Status  Carbohydrate Drink Given Prior to Surgery? : N  Date of Last Liquid: 03/05/24  Time of Last Liquid: 0830  Date of Last Solid: 03/04/24  Time of Last Solid: 1900  Last Intake Type: Clear fluids  Time of Last Void: 1200         Physical Exam    Airway  Mallampati: II  TM distance: >3 FB  Neck ROM: full     Cardiovascular - normal exam  Rhythm: regular  Rate: normal     Dental - normal exam     Pulmonary - normal exam  Breath sounds clear to auscultation     Abdominal   Abdomen: soft  Bowel sounds: normal           Anesthesia Plan    History of general anesthesia?: yes  History of complications of general anesthesia?: no    ASA 2     general     The patient is not a current smoker.  Patient was previously instructed to abstain from smoking on day of procedure.  Patient did not smoke on day of procedure.  Education provided regarding risk of obstructive sleep apnea.  intravenous induction   Postoperative administration of opioids is intended.  Anesthetic plan and risks discussed with patient.  Use of blood products discussed with patient who.    Plan discussed with CAA and CRNA.

## 2024-03-05 NOTE — ANESTHESIA PROCEDURE NOTES
Airway  Date/Time: 3/5/2024 2:54 PM  Urgency: elective    Airway not difficult    Staffing  Performed: AMAYA   Authorized by: Kana Almanza MD    Performed by: AMAYA Le  Patient location during procedure: OR    Indications and Patient Condition  Indications for airway management: anesthesia  Spontaneous ventilation: present  Sedation level: deep  Preoxygenated: yes  Patient position: sniffing  Mask difficulty assessment: 0 - not attempted  Planned trial extubation    Final Airway Details  Final airway type: supraglottic airway      Successful airway: classic  Size 3     Number of attempts at approach: 1

## 2024-03-29 ENCOUNTER — EVALUATION (OUTPATIENT)
Dept: PHYSICAL THERAPY | Facility: CLINIC | Age: 54
End: 2024-03-29
Payer: COMMERCIAL

## 2024-03-29 DIAGNOSIS — S83.232D COMPLEX TEAR OF MEDIAL MENISCUS, CURRENT INJURY, LEFT KNEE, SUBSEQUENT ENCOUNTER: ICD-10-CM

## 2024-03-29 PROCEDURE — 97161 PT EVAL LOW COMPLEX 20 MIN: CPT | Mod: GP

## 2024-03-29 PROCEDURE — 97110 THERAPEUTIC EXERCISES: CPT | Mod: GP

## 2024-03-29 ASSESSMENT — COLUMBIA-SUICIDE SEVERITY RATING SCALE - C-SSRS
6. HAVE YOU EVER DONE ANYTHING, STARTED TO DO ANYTHING, OR PREPARED TO DO ANYTHING TO END YOUR LIFE?: NO
2. HAVE YOU ACTUALLY HAD ANY THOUGHTS OF KILLING YOURSELF?: NO
1. IN THE PAST MONTH, HAVE YOU WISHED YOU WERE DEAD OR WISHED YOU COULD GO TO SLEEP AND NOT WAKE UP?: NO

## 2024-03-29 ASSESSMENT — PATIENT HEALTH QUESTIONNAIRE - PHQ9
2. FEELING DOWN, DEPRESSED OR HOPELESS: NOT AT ALL
SUM OF ALL RESPONSES TO PHQ9 QUESTIONS 1 AND 2: 0
1. LITTLE INTEREST OR PLEASURE IN DOING THINGS: NOT AT ALL

## 2024-03-29 NOTE — PROGRESS NOTES
Physical Therapy Evaluation    Patient Name Eri Tamayo  MRN: [id number]  Today's Date: 03/29/24  Time Calculation  Start Time: 1045  Stop Time: 1125  Time Calculation (min): 40 min    Insurance: Payor: [employer] / Plan: [employer] / Product Type: *No Product type* /  $35 [employer]AY VISITS ARE MED NEC NO AUTH NEEDED PAYS % OOP 6350.00 2999.10 APPLIED /   -authorization required: no  -Next MD appointment: 4/15/24    Problem List Items Addressed This Visit    None  Visit Diagnoses         Codes    Complex tear of medial meniscus, current injury, left knee, subsequent encounter     S83.232D    Relevant Orders    Follow Up In Physical Therapy            Onset Date: DOS 3/5/24  Referring Provider: Juan Diego Montilla MD   PT Orders: eval and treat    Assessment:    Impression/Clinical Presentation: dec ROM/strength/function s/p L medial menisectomy limiting function with standing activities, driving, and household chores     Skilled PT services will aid in advancing functional status and attaining therapy goals.    Problem List:  -activity limitations  -Functional limitations  -Pain L knee  -decreased  ROM  -decreased strength   -decreased flexibility  -decreased knowledge of HEP      Goals:  STG 2 wks  Compliant with HEP  Dec pain 25%  Return to driving    LTG by discharge  I HEP  Improve functional outcome score to indicate improved functional mobility  Dec pain L knee % on pain scale  Inc LLE strength 5/5 with ability to resume household chores  Inc LE flexibility WNL  Reciprocal stairs  I amb WNL gait  Inc AROM L knee WNL with improved LE dsg/car transfers    Rehab Potential:  good  Clinical Presentation:    stable/and/or uncomplicated characteristics                                              Level of Complexity: low  Plan:    Therapeutic exercise, Manual therapy, Gait training, Home program instruction and progression, Neuromuscular  re-education, Therapeutic activities, Self care and home management, Instruction in activity modification, Electrical stimulation, Vasopneumatic device + cold, and Cryotherapy  Nustep for soft tissue warmup, ROM/strengthening LE, LE flexibility, CKC activities, gait/stair training, CP/game ready      1 x week  until goals met or maximum rehab potential met  Pt is currently scheduled for 6 weeks    Plan of care was designed with input and agreement by the patient    Subjective:    Current Episode of Functional Impairment and/or Pain :  Chief complaint/HPI:  Was cleaning shower and slipped and fell and tore medial meniscus 8 months prior  L knee medial menisectomy 3/5/24  Arrived on crutches    Pain  Pain assessment 0-10  Pain score: 5  Pain location: L knee medial  Type: sharp    Exacerbating Factors: pivoting  Relieving Factors: ice    Current Medical management:     PMHx: Reviewed medical history form with patient and medical screening assessed.   HTN, graves, hashimoto     Medications for pain: none     Diagnostic Tests: N/A    Precautions: no fall risk    Functional Limitations: Dressing, Walking, Stair negotiation, and Standing  Not driving yet, household chores, LE dsg/car transfers    Home Living Situation: lives with  , 2 story home with laundry in basement    Prior Level of Function I amb w/o device    Patient Stated Goal For Therapy  Inc strength/ROM  Occupation:     Objective:    Ortho:  AROM knee L 8-99  after exercises     Strength   RLE 5/5  LLE  knee ext: 3+          flex: 3+  hip flex: 4-        abd: 3+        add: 3-    flexibility:  hamstrings: R 85   L 70  TFL: WNL    Special Tests  patellar mobility: mild dec  patellar compression: -  patellar tracking: unable to assess due to poor QS    Palpation: mild edema  POP medial/lateral joint line    Gait:step to pattern  I amb with one crutch/WBAT  No device household distances    Outcome Measures:  Other Measures  Lower  Extremity Funtional Score (LEFS): 39     Treatment:    PT Evaluation Time Entry  PT Evaluation (Low) Time Entry: 20  minutes    Therapeutic Exercise:                             20 Minutes    Nustep L4 10'  QS with towel under knee to facilitate quad activation 15x  Adductor sets 15x  Hooklying hip abduction green 15x  Supine hamstring stretch 10 sec 5x  Heel slides 15x     Response to treatment: improved knowledge and understanding of condition    Education: Educated on relevant anatomy and expected plan of care  Instructed in initial HEP including reasoning of given exercises and issued written instructions

## 2024-04-12 ENCOUNTER — TREATMENT (OUTPATIENT)
Dept: PHYSICAL THERAPY | Facility: CLINIC | Age: 54
End: 2024-04-12
Payer: COMMERCIAL

## 2024-04-12 DIAGNOSIS — S83.232D COMPLEX TEAR OF MEDIAL MENISCUS, CURRENT INJURY, LEFT KNEE, SUBSEQUENT ENCOUNTER: ICD-10-CM

## 2024-04-12 PROCEDURE — 97110 THERAPEUTIC EXERCISES: CPT | Mod: GP

## 2024-04-12 PROCEDURE — 97112 NEUROMUSCULAR REEDUCATION: CPT | Mod: GP

## 2024-04-12 NOTE — PROGRESS NOTES
"                                                                Physical Therapy Treatment Note      Patient Name Eri Tamayo  MRN: [id number]  Today's Date: 04/12/24  Time Calculation  Start Time: 1000  Stop Time: 1040  Time Calculation (min): 40 min    Insurance: Payor: [employer] / Plan: [employer] / Product Type: *No Product type* /  $35 [employer] VISITS ARE MED NEC NO AUTH NEEDED PAYS % OOP 6350.00 2999.10 APPLIED /   Visit #: 2  Date of Onset:  DOS 3/5/24   -authorization required: no    General:  Next MD appt: 4/15/24  Juan Diego Montilla MD     Problem List Items Addressed This Visit             ICD-10-CM    Complex tear of medial meniscus, current injury, left knee, subsequent encounter S83.232D       Assessment:  skilled intervention: exercise progression for strength    Patient would continue to benefit from skilled PT to address remaining functional, objective and subjective deficits to allow them to return to full independence with ADLs     Has progressed off assistive device with amb with slight knee flexion  Progressed in CKC w/o difficulty  Good progression with ROM  Difficulty with LAQ/SLR due to quad weakness    Plan:  Step downs  Shuttle  Add weight to sidelying hip abduction    Precautions: no fall risk    Subjective:  General:  Hasn't needed ice recently  Functional Progress:  Amb w/o crutches now  Starting to do reciprocal stairs    Pain  Pain assessment 0-10  Pain score: 3  Pain location: L knee    Compliant with HEP:  yes    Understanding of HEP: WNL    Objective:  Therapeutic Exercise  30 minutes  see below  **indicates new exercises or progression  NP=not performed    Neuromuscular Re-education:  10 minutes  See below  **indicates new exercises or progression  NP=not performed        Other     Exercise Log:  AROM knee L 1-113    Nustep L4 10'  Slantboard stretch 10 sec 10x **  Step ups F/L 6\" 10x **  SLS airex 10 sec 10x **     QS with towel under knee to " facilitate quad activation 15x  Adductor sets 20x  Hooklying hip abduction green 20x  Supine hamstring stretch 10 sec 5x  strap **  Heel slides 10x   Seated hamstring curl green 10x2 **  LAQ 10x2  1# **  SLR  10x2 **  Sidelying hip abduction 10x2 **    Education:  Instructed in progression of exercises

## 2024-04-19 ENCOUNTER — TREATMENT (OUTPATIENT)
Dept: PHYSICAL THERAPY | Facility: CLINIC | Age: 54
End: 2024-04-19
Payer: COMMERCIAL

## 2024-04-19 DIAGNOSIS — S83.232D COMPLEX TEAR OF MEDIAL MENISCUS, CURRENT INJURY, LEFT KNEE, SUBSEQUENT ENCOUNTER: ICD-10-CM

## 2024-04-19 PROCEDURE — 97110 THERAPEUTIC EXERCISES: CPT | Mod: GP

## 2024-04-19 PROCEDURE — 97112 NEUROMUSCULAR REEDUCATION: CPT | Mod: GP

## 2024-04-19 NOTE — PROGRESS NOTES
"                                                                Physical Therapy Treatment Note      Patient Name Eri Tamayo  MRN: [id number]  Today's Date: 04/19/24  Time Calculation  Start Time: 1040  Stop Time: 1120  Time Calculation (min): 40 min    Insurance: Payor: [employer] / Plan: [employer] / Product Type: *No Product type* /  $35 [employer]AY VISITS ARE MED NEC NO AUTH NEEDED PAYS % OOP 6350.00 2999.10 APPLIED /   Visit #: 3  Date of Onset: DOS 3/5/24   -authorization required: no      General:  Next MD appt: 4/15/24 Juan Diego Montilla MD     Problem List Items Addressed This Visit             ICD-10-CM    Complex tear of medial meniscus, current injury, left knee, subsequent encounter S83.232D       Assessment:  skilled intervention: exercise progression for strength    Patient would continue to benefit from skilled PT to address remaining functional, objective and subjective deficits to allow them to return to full independence with ADLs     Initiated shuttle and step downs w/o difficulty although lacked quad control to perform 6\" step down  Amb with L knee in slight flexion with dec heel strike  Good progress with knee flexion today    Plan:  Single leg press    Precautions: no fall risk    Subjective:  General:  Icing less  Took tylenol today for first time in 3 wks    Functional Progress:  Challenged with descending steps  Pain  Pain assessment 0-10  Pain score: 5  Pain location: L knee    Compliant with HEP:  yes    Understanding of HEP: WNL    Objective:  Therapeutic Exercise  30 minutes  see below  **indicates new exercises or progression  NP=not performed    Neuromuscular Re-education:  10 minutes  See below  **indicates new exercises or progression  NP=not performed      Other     Exercise Log:  AROM knee L 1-125    Nustep L4 10'  Slantboard stretch 10 sec 10x   Step ups F/L 6\" 10x   SLS airex 10 sec 10x   Step downs 4\" 10x **  Shuttle BLE leg press 50# 10x2 **  Shuttle " BLE calf raise 50# 10x2 **     Supine hamstring stretch 10 sec 5x  strap   Heel slides 10x   Seated hamstring curl green 10x2   TKE green 10x2 **  LAQ 10x2  1#   SLR  10x2 1# **  Sidelying hip abduction 10x2 1# **    QS with towel under knee to facilitate quad activation 15x (HEP)  Hooklying hip abduction green 20x(HEP)  Adductor sets 20x(HEP)    Education:  Instructed in progression of exercises

## 2024-04-26 ENCOUNTER — TREATMENT (OUTPATIENT)
Dept: PHYSICAL THERAPY | Facility: CLINIC | Age: 54
End: 2024-04-26
Payer: COMMERCIAL

## 2024-04-26 DIAGNOSIS — S83.232D COMPLEX TEAR OF MEDIAL MENISCUS, CURRENT INJURY, LEFT KNEE, SUBSEQUENT ENCOUNTER: ICD-10-CM

## 2024-04-26 PROCEDURE — 97110 THERAPEUTIC EXERCISES: CPT | Mod: GP,CQ

## 2024-04-26 PROCEDURE — 97112 NEUROMUSCULAR REEDUCATION: CPT | Mod: GP,CQ

## 2024-04-26 NOTE — PROGRESS NOTES
Physical Therapy Treatment Note      Patient Name Eri Tamayo  MRN: [id number]  Today's Date: 04/26/24    Time Calculation  Start Time: 1000  Stop Time: 1045  Time Calculation (min): 45 min    Insurance: Payor: [employer] / Plan: [employer] / Product Type: *No Product type* /  $35 [employer]AY VISITS ARE MED NEC NO AUTH NEEDED PAYS % OOP 6350.00 2999.10 APPLIED /   Visit #: 4  Date of Onset: DOS 3/5/24   -authorization required: no    General:  Next MD appt: 4/15/24 Juan Diego Montilla MD     Problem List Items Addressed This Visit             ICD-10-CM    Complex tear of medial meniscus, current injury, left knee, subsequent encounter S83.565D     Assessment:  skilled intervention: exercise progression for strength    Patient presented to session with mild pain, if any, in the L knee medially. Displayed progress with eccentric control during 4 inch step downs this date. Intermittent finger touch needed for stability during L SLS on Air Ex. Able to add single leg press on the shuttle without difficulty using proper LE alignment.    Patient would continue to benefit from skilled PT to address remaining functional, objective and subjective deficits to allow them to return to full independence with ADLs     Plan:  Trial resisted side stepping and/or monster walk next visit    Precautions: no fall risk    Subjective:  General:  Less pain than last week in the L knee    Functional Progress:  Going down steps is becoming doable at home    Pain  Pain assessment 0-10  Pain score: 2  Pain location: L knee    Compliant with HEP:  yes    Understanding of HEP: WNL    Objective:  Therapeutic Exercise  30 minutes  see below  **indicates new exercises or progression  NP=not performed    Neuromuscular Re-education:  15 minutes  See below  **indicates new exercises or progression  NP=not performed      Other     Exercise Log:  AROM knee L 1-125   "  Nustep L5 10' LEs only **  Slantboard stretch 10 sec 10x   Step ups F/L 6\" 10x2 **   SLS airex 10 sec 10x   Step downs 4\" 10x2 **  Shuttle BLE leg press 50# 10x2   Shuttle BLE calf raise 50# 10x2   Shuttle single leg press 37# 10x2 **  Supine hamstring stretch 10 sec 5x  strap NP  Heel slides 10x NP  Seated hamstring curl green 10x2   TKE green 10x2   LAQ 10x2  1#   SLR  10x2 1#   Sidelying hip abduction 10x2 1#     QS with towel under knee to facilitate quad activation 15x (HEP)  Hooklying hip abduction green 20x(HEP)  Adductor sets 20x(HEP)    Education:  Exercise technique, postural awareness, exercise progression   "

## 2024-05-03 ENCOUNTER — TREATMENT (OUTPATIENT)
Dept: PHYSICAL THERAPY | Facility: CLINIC | Age: 54
End: 2024-05-03
Payer: COMMERCIAL

## 2024-05-03 DIAGNOSIS — S83.232D COMPLEX TEAR OF MEDIAL MENISCUS, CURRENT INJURY, LEFT KNEE, SUBSEQUENT ENCOUNTER: ICD-10-CM

## 2024-05-03 PROCEDURE — 97112 NEUROMUSCULAR REEDUCATION: CPT | Mod: GP,CQ

## 2024-05-03 PROCEDURE — 97110 THERAPEUTIC EXERCISES: CPT | Mod: GP,CQ

## 2024-05-03 NOTE — PROGRESS NOTES
"                                                                Physical Therapy Treatment Note      Patient Name Eri Tamayo  MRN: [id number]  Today's Date: 05/03/24    Time Calculation  Start Time: 1005  Stop Time: 1050  Time Calculation (min): 45 min    Insurance: Payor: [employer] / Plan: [employer] / Product Type: *No Product type* /  $35 [employer]AY VISITS ARE MED NEC NO AUTH NEEDED PAYS % OOP 6350.00 2999.10 APPLIED /   Visit #: 5  Date of Onset: DOS 3/5/24   -authorization required: no    General:  Next MD appt: 4/15/24 Juan Diego Montilla MD     Problem List Items Addressed This Visit             ICD-10-CM    Complex tear of medial meniscus, current injury, left knee, subsequent encounter S83.232D       Assessment:  skilled intervention: exercise progression for strength    Patient presented to session with minimal pain, if any, in the L knee. Expressed feeling some \"spastic sensations\" during SL press on shuttle. Progressed with light resistance monster walk and side stepping away from the bar without pain. Able to increase step down height to 6 inches with improving eccentric control as reps progressed. Demonstarted improved strength when ankle weight was increased for LAQ and supine SLR/hip abd. Updated HEP.    Patient would continue to benefit from skilled PT to address remaining functional, objective and subjective deficits to allow them to return to full independence with ADLs     Plan:  Try to increase step up height NV    Precautions: no fall risk    Subjective:  General:  States she notices how much better the L knee is getting each day; minimal, if any pain.    Functional Progress:  Is able to go down steps now without pain    Pain  Pain assessment 0-10  Pain score: 0  Pain location: L knee    Compliant with HEP:  yes    Understanding of HEP: WNL    Objective:  Therapeutic Exercise  30 minutes  see below  **indicates new exercises or progression  NP=not " "performed    Neuromuscular Re-education:  15 minutes  See below  **indicates new exercises or progression  NP=not performed      Other     Exercise Log:  AROM knee L 1-125    Nustep L5 10' LEs only NP occupied  Recumbent bike L5 x 10 minutes  Slantboard stretch 10 sec 10x   Step ups F/L 6\" 10x2   Step downs 6\" 10x2 **  SLS airex 10 sec 10x   Shuttle BLE leg press 50# 10x2   Shuttle BLE calf raise 50# 10x2   Shuttle single leg press 37# 10x2   Resisted sidestepping x 2 laps red **  Monster walk x 2 laps red **    Supine hamstring stretch 10 sec 5x  strap   Heel slides 10x   Seated hamstring curl green 10x2   TKE green 10x2   LAQ 10x2  2# **  SLR  10x2 2# **   Sidelying hip abduction 10x2 2# **    QS with towel under knee to facilitate quad activation 15x (HEP)  Hooklying hip abduction green 20x(HEP)  Adductor sets 20x(HEP)    Education:  Exercise technique, postural awareness, exercise progression   "

## 2024-05-10 ENCOUNTER — TREATMENT (OUTPATIENT)
Dept: PHYSICAL THERAPY | Facility: CLINIC | Age: 54
End: 2024-05-10
Payer: COMMERCIAL

## 2024-05-10 DIAGNOSIS — S83.232D COMPLEX TEAR OF MEDIAL MENISCUS, CURRENT INJURY, LEFT KNEE, SUBSEQUENT ENCOUNTER: ICD-10-CM

## 2024-05-10 PROCEDURE — 97110 THERAPEUTIC EXERCISES: CPT | Mod: GP,CQ

## 2024-05-10 PROCEDURE — 97112 NEUROMUSCULAR REEDUCATION: CPT | Mod: GP,CQ

## 2024-05-10 NOTE — PROGRESS NOTES
Physical Therapy Treatment Note      Patient Name Eri Tamayo  MRN: [id number]  Today's Date: 05/10/24    Time Calculation  Start Time: 0920  Stop Time: 1005  Time Calculation (min): 45 min    Insurance: Payor: [employer] / Plan: [employer] / Product Type: *No Product type* /  $35 [employer]AY VISITS ARE MED NEC NO AUTH NEEDED PAYS % OOP 6350.00 2999.10 APPLIED /   Visit #: 6  Date of Onset: DOS 3/5/24   -authorization required: no    General:  Next MD appt: 4/15/24 Juan Diego Montilla MD     Problem List Items Addressed This Visit             ICD-10-CM    Complex tear of medial meniscus, current injury, left knee, subsequent encounter S83.232D       Assessment:  skilled intervention: exercise progression for strength    Patient presented to session with soreness and irritability along the anterior joint line and posterior L knee; feels it was related to the side-stepping resistance theraband so it was held this session. Has a trigger point in the L distal hamstring during palpation; educated patient to stretch the hamstring and perform some self massage to that region to assist. Able to increase step up and step down height to 8 inches without using UE support with good tolerance.    Patient would continue to benefit from skilled PT to address remaining functional, objective and subjective deficits to allow them to return to full independence with ADLs     Plan:  Next visit try to resume resisted side stepping with band above the knee    Precautions: no fall risk    Subjective:  General:  States she has discomfort along the L knee joint line and behind the knee since doing the resisted side steps.    Functional Progress:  Has some pain with going down steps    Pain  Pain assessment 0-10  Pain score: 0  Pain location: L knee    Compliant with HEP:  yes    Understanding of HEP: WNL    Objective:  Therapeutic Exercise  30  "minutes  see below  **indicates new exercises or progression  NP=not performed    Neuromuscular Re-education:  15 minutes  See below  **indicates new exercises or progression  NP=not performed      Other     Exercise Log:  AROM knee L 1-125    Nustep L5 x 10' LEs only  Recumbent bike L5 x 10 minutes NP  Slantboard stretch 10 sec 10x   Step ups F/L 8\" 10x2 **  Step downs 8\" 10x2 **  SLS airex 10 sec 10x   Shuttle BLE leg press 50# 10x2   Shuttle BLE calf raise 50# 10x2   Shuttle single leg press 37# 10x2   Resisted sidestepping x 2 laps red - NV  Monster walk x 2 laps red - NV    Supine hamstring stretch 10 sec 10x  strap **  Heel slides 10x NP  Seated hamstring curl green 10x2   TKE green 10x2 - NV  LAQ 10x2  2#   SLR  10x2 2#   Sidelying hip abduction 10x2 2#     QS with towel under knee to facilitate quad activation 15x (HEP)  Hooklying hip abduction green 20x(HEP)  Adductor sets 20x(HEP)    Education:  Exercise technique, postural awareness, exercise progression   "

## 2024-05-17 ENCOUNTER — TREATMENT (OUTPATIENT)
Dept: PHYSICAL THERAPY | Facility: CLINIC | Age: 54
End: 2024-05-17
Payer: COMMERCIAL

## 2024-05-17 DIAGNOSIS — S83.232D COMPLEX TEAR OF MEDIAL MENISCUS, CURRENT INJURY, LEFT KNEE, SUBSEQUENT ENCOUNTER: ICD-10-CM

## 2024-05-17 PROCEDURE — 97112 NEUROMUSCULAR REEDUCATION: CPT | Mod: GP

## 2024-05-17 PROCEDURE — 97110 THERAPEUTIC EXERCISES: CPT | Mod: GP

## 2024-05-17 NOTE — PROGRESS NOTES
Physical Therapy Treatment Note      Patient Name Eri Tamayo  MRN: [id number]  Today's Date: 5/17/2024  Time Calculation  Start Time: 1040  Stop Time: 1125  Time Calculation (min): 45 min    Insurance: Payor: [employer] / Plan: [employer] / Product Type: *No Product type* / $35 [employer]AY VISITS ARE MED NEC NO AUTH NEEDED PAYS % OOP 6350.00 2999.10 APPLIED /   Visit #: 7  Date of Onset:DOS 3/5/24   -authorization required: no    General:  Next MD appt: 7/15/24    Problem List Items Addressed This Visit             ICD-10-CM    Complex tear of medial meniscus, current injury, left knee, subsequent encounter S83.232D       Assessment:  skilled intervention: exercise progression for strength  Reasmt, education for HEP    Progressed with single leg calf raises on shuttle  Dec reps on step exercises due to pain and dec step height on step downs to improve control  Inc pain appears to be related to IT band/lateral hamstring tightness-instructed in self management    LTG   I HEP (met)  Improve functional outcome score to indicate improved functional mobility(met)  Dec pain L knee % on pain scale (met)  Inc LLE strength 5/5 with ability to resume household chores(met)  Inc LE flexibility WNL  Reciprocal stairs (met)  I amb WNL gait (met)  Inc AROM L knee WNL with improved LE dsg/car transfers(met)    Plan:  Discharge to [facility] for report period 3/29/24-5/17/24    Precautions: no fall risk    Subjective:  General:  Inc pain for a few weeks   At least 50% dec pain since onset of PT    Functional Progress:  Walking tolerance improved  Reciprocal stairs  LE dsg.car transfers WNL  Doing some yard work    Pain  Pain assessment 0-10  Pain score: 2 descending steps  Pain location: lateral L knee    Compliant with HEP:  yes    Understanding of HEP: WNL    Objective:  Therapeutic Exercise  30 minutes  see below  **indicates new exercises  "or progression  NP=not performed    Neuromuscular Re-education:  15 minutes  See below  **indicates new exercises or progression  NP=not performed      Modalities   Cold Pack                          minutes    Electric Stimulation     minutes    Other   AROM knee L 0-140 = B   Strength   RLE 5/5  LLE  knee ext: 5        flex: 5  hip flex: 5        abd: 5        add: 5-     flexibility:  hamstrings: B  85  TFL: WNL     Special Tests  patellar mobility: WNL    POP at L IT band    Exercise Log:  AROM knee L 0-140  Nustep L5 x 10' LEs only  Recumbent bike L5 x 10 minutes NP  Slantboard stretch 10 sec 10x   Step ups F/L 8\" 10x   Step downs 6\" 10x  **modified  SLS airex 10 sec 10x   Shuttle BLE leg press 50# 10x2   Shuttle LLE calf raise 50# 10x2 **  Shuttle single leg press 37# 10x2   Resisted sidestepping x 2 laps red - NV  Monster walk x 2 laps red - NV     Supine hamstring stretch 10 sec 10x  strap   Heel slides 10x NP  Seated hamstring curl green 10x2   TKE green 10x2 - NV  LAQ 10x2  2#   SLR  10x2 2#   Sidelying hip abduction 10x2 2#      QS with towel under knee to facilitate quad activation 15x (HEP)  Hooklying hip abduction green 20x(HEP)  Adductor sets 20x(HEP)    Education:  Review of HEP and progression  Instructed in STM techniques for IT band   Suggested use of foam roller if able  Issued blue tband    Outcome Measures:  Other Measures  Lower Extremity Funtional Score (LEFS): 65       "

## 2024-11-01 ENCOUNTER — HOSPITAL ENCOUNTER (OUTPATIENT)
Dept: RADIOLOGY | Facility: CLINIC | Age: 54
Discharge: HOME | End: 2024-11-01
Payer: COMMERCIAL

## 2024-11-01 DIAGNOSIS — Z12.31 SCREENING MAMMOGRAM FOR BREAST CANCER: ICD-10-CM

## 2024-11-01 PROCEDURE — 77067 SCR MAMMO BI INCL CAD: CPT | Performed by: RADIOLOGY

## 2024-11-01 PROCEDURE — 77067 SCR MAMMO BI INCL CAD: CPT

## 2024-11-01 PROCEDURE — 77063 BREAST TOMOSYNTHESIS BI: CPT | Performed by: RADIOLOGY

## (undated) DEVICE — BLADE, STRYKER, 4.0MM, ANGLED, AGGRESSIVE PLUS

## (undated) DEVICE — DRAPE, SHEET, U, STERI DRAPE, 47 X 51 IN, DISPOSABLE, STERILE

## (undated) DEVICE — GLOVE, SURGICAL, PROTEXIS PI W/NEU-THERA, 7.5, PF, LF

## (undated) DEVICE — BLADE, STRYKER, 4.0MM, AGG PLUS SHVBLD ULTMT

## (undated) DEVICE — BANDAGE, ESMARK, 6 IN X 12 FT

## (undated) DEVICE — SUTURE, ETHILON, 4-0, 18 IN, FS-2, BLACK

## (undated) DEVICE — TUBESET, INTERMEDIARY

## (undated) DEVICE — BANDAGE, COFLEX, 6 X 5 YDS, FOAM TAN, STERILE, LF

## (undated) DEVICE — SOLUTION, IRRIGATION, STERILE WATER, 1000 ML, POUR BOTTLE

## (undated) DEVICE — SOLUTION, IRRIGATION, SODIUM CHLORIDE 0.9%, 1000 ML, POUR BOTTLE

## (undated) DEVICE — TOWEL PACK, STERILE, 4/PACK, BLUE

## (undated) DEVICE — Device

## (undated) DEVICE — TUBING, NFLOW, FMS VUE, SNGL USE, DISP, LF

## (undated) DEVICE — DRESSING, GAUZE, PETROLATUM, PATCH, XEROFORM, 1 X 8 IN, STERILE

## (undated) DEVICE — EXTENSION SET, IV, 30 IN, LUER LOCK

## (undated) DEVICE — SOLUTION, IRRIGATION, USP, SODIUM CHLORIDE 0.9%, 3000 ML, BAG

## (undated) DEVICE — PADDING, UNDERCAST, WEBRIL, 6 IN X 4 YD, REG, NS

## (undated) DEVICE — DRESSING, ABDOMINAL, WET PRUF, TENDERSORB, 5 X 9 IN, STERILE